# Patient Record
Sex: FEMALE | Race: WHITE | Employment: PART TIME | URBAN - METROPOLITAN AREA
[De-identification: names, ages, dates, MRNs, and addresses within clinical notes are randomized per-mention and may not be internally consistent; named-entity substitution may affect disease eponyms.]

---

## 2021-07-15 ENCOUNTER — HOSPITAL ENCOUNTER (EMERGENCY)
Facility: CLINIC | Age: 41
Discharge: HOME | End: 2021-07-16
Attending: EMERGENCY MEDICINE | Admitting: EMERGENCY MEDICINE
Payer: COMMERCIAL

## 2021-07-15 ENCOUNTER — APPOINTMENT (OUTPATIENT)
Dept: CT IMAGING | Facility: CLINIC | Age: 41
End: 2021-07-15
Attending: EMERGENCY MEDICINE
Payer: COMMERCIAL

## 2021-07-15 DIAGNOSIS — R07.9 CHEST PAIN, UNSPECIFIED TYPE: ICD-10-CM

## 2021-07-15 LAB
ANION GAP SERPL CALCULATED.3IONS-SCNC: 6 MMOL/L (ref 3–14)
BASOPHILS # BLD AUTO: 0 10E3/UL (ref 0–0.2)
BASOPHILS NFR BLD AUTO: 0 %
BUN SERPL-MCNC: 14 MG/DL (ref 7–30)
CALCIUM SERPL-MCNC: 9.1 MG/DL (ref 8.5–10.1)
CHLORIDE BLD-SCNC: 107 MMOL/L (ref 94–109)
CO2 SERPL-SCNC: 25 MMOL/L (ref 20–32)
CREAT SERPL-MCNC: 0.95 MG/DL (ref 0.52–1.04)
D DIMER PPP FEU-MCNC: 1.54 UG/ML FEU (ref 0–0.5)
EOSINOPHIL # BLD AUTO: 0.2 10E3/UL (ref 0–0.7)
EOSINOPHIL NFR BLD AUTO: 2 %
ERYTHROCYTE [DISTWIDTH] IN BLOOD BY AUTOMATED COUNT: 13.3 % (ref 10–15)
GFR SERPL CREATININE-BSD FRML MDRD: 76 ML/MIN/1.73M2
GLUCOSE BLD-MCNC: 82 MG/DL (ref 70–99)
HCG SERPL QL: NEGATIVE
HCT VFR BLD AUTO: 34.7 % (ref 35–47)
HGB BLD-MCNC: 11 G/DL (ref 11.7–15.7)
HOLD SPECIMEN: NORMAL
IMM GRANULOCYTES # BLD: 0 10E3/UL
IMM GRANULOCYTES NFR BLD: 0 %
LYMPHOCYTES # BLD AUTO: 1.9 10E3/UL (ref 0.8–5.3)
LYMPHOCYTES NFR BLD AUTO: 27 %
MCH RBC QN AUTO: 29.5 PG (ref 26.5–33)
MCHC RBC AUTO-ENTMCNC: 31.7 G/DL (ref 31.5–36.5)
MCV RBC AUTO: 93 FL (ref 78–100)
MONOCYTES # BLD AUTO: 0.7 10E3/UL (ref 0–1.3)
MONOCYTES NFR BLD AUTO: 9 %
NEUTROPHILS # BLD AUTO: 4.4 10E3/UL (ref 1.6–8.3)
NEUTROPHILS NFR BLD AUTO: 62 %
NRBC # BLD AUTO: 0 10E3/UL
NRBC BLD AUTO-RTO: 0 /100
PLATELET # BLD AUTO: 203 10E3/UL (ref 150–450)
POTASSIUM BLD-SCNC: 3.8 MMOL/L (ref 3.4–5.3)
RBC # BLD AUTO: 3.73 10E6/UL (ref 3.8–5.2)
SODIUM SERPL-SCNC: 138 MMOL/L (ref 133–144)
TROPONIN I SERPL-MCNC: <0.015 UG/L (ref 0–0.04)
WBC # BLD AUTO: 7.1 10E3/UL (ref 4–11)

## 2021-07-15 PROCEDURE — 84703 CHORIONIC GONADOTROPIN ASSAY: CPT | Performed by: EMERGENCY MEDICINE

## 2021-07-15 PROCEDURE — 36592 COLLECT BLOOD FROM PICC: CPT | Performed by: EMERGENCY MEDICINE

## 2021-07-15 PROCEDURE — 80048 BASIC METABOLIC PNL TOTAL CA: CPT | Performed by: EMERGENCY MEDICINE

## 2021-07-15 PROCEDURE — 85379 FIBRIN DEGRADATION QUANT: CPT | Performed by: EMERGENCY MEDICINE

## 2021-07-15 PROCEDURE — 93005 ELECTROCARDIOGRAM TRACING: CPT

## 2021-07-15 PROCEDURE — 250N000009 HC RX 250: Performed by: EMERGENCY MEDICINE

## 2021-07-15 PROCEDURE — 84484 ASSAY OF TROPONIN QUANT: CPT | Performed by: EMERGENCY MEDICINE

## 2021-07-15 PROCEDURE — 36415 COLL VENOUS BLD VENIPUNCTURE: CPT | Performed by: EMERGENCY MEDICINE

## 2021-07-15 PROCEDURE — 71275 CT ANGIOGRAPHY CHEST: CPT

## 2021-07-15 PROCEDURE — 85025 COMPLETE CBC W/AUTO DIFF WBC: CPT | Performed by: EMERGENCY MEDICINE

## 2021-07-15 PROCEDURE — 99285 EMERGENCY DEPT VISIT HI MDM: CPT | Mod: 25

## 2021-07-15 PROCEDURE — 250N000011 HC RX IP 250 OP 636: Performed by: EMERGENCY MEDICINE

## 2021-07-15 RX ORDER — IOPAMIDOL 755 MG/ML
500 INJECTION, SOLUTION INTRAVASCULAR ONCE
Status: COMPLETED | OUTPATIENT
Start: 2021-07-15 | End: 2021-07-15

## 2021-07-15 RX ADMIN — SODIUM CHLORIDE 86 ML: 9 INJECTION, SOLUTION INTRAVENOUS at 23:12

## 2021-07-15 RX ADMIN — IOPAMIDOL 67 ML: 755 INJECTION, SOLUTION INTRAVENOUS at 23:12

## 2021-07-15 ASSESSMENT — ENCOUNTER SYMPTOMS
SHORTNESS OF BREATH: 0
COUGH: 0
FEVER: 0

## 2021-07-15 ASSESSMENT — MIFFLIN-ST. JEOR: SCORE: 1614.82

## 2021-07-16 VITALS
HEIGHT: 67 IN | HEART RATE: 64 BPM | DIASTOLIC BLOOD PRESSURE: 107 MMHG | BODY MASS INDEX: 31.39 KG/M2 | OXYGEN SATURATION: 99 % | SYSTOLIC BLOOD PRESSURE: 120 MMHG | TEMPERATURE: 98.2 F | RESPIRATION RATE: 20 BRPM | WEIGHT: 200 LBS

## 2021-07-16 LAB
ATRIAL RATE - MUSE: 65 BPM
DIASTOLIC BLOOD PRESSURE - MUSE: NORMAL MMHG
INTERPRETATION ECG - MUSE: NORMAL
P AXIS - MUSE: -6 DEGREES
PR INTERVAL - MUSE: 162 MS
QRS DURATION - MUSE: 88 MS
QT - MUSE: 392 MS
QTC - MUSE: 407 MS
R AXIS - MUSE: 60 DEGREES
SYSTOLIC BLOOD PRESSURE - MUSE: NORMAL MMHG
T AXIS - MUSE: 38 DEGREES
VENTRICULAR RATE- MUSE: 65 BPM

## 2021-07-16 NOTE — ED PROVIDER NOTES
History   Chief Complaint:  Chest Pain       The history is provided by the patient.      Ximena Grant is a 41 year old female with history of papillary thyroid carcinoma with removal three months ago who presents for evaluation of centralized chest pressure. The patient states that she began having this feeling in December, prior to her thyroid carcinoma diagnosis. She was seen at that time and was noted to have an elevated d-dimer and had a CT scan obtained at that time which indicated her thyroid carcinoma. Ultrasound of her leg was negative at that time as well. She underwent surgery for removal in April and has had no complications, noting she is not undergoing chemotherapy. She had hoped that the removal of the thyroid carcinoma would resolve her chest pain issues, however, this has continued to be intermittent since the surgery. Today, this pain has become constant, which concerned her. She also noted a shooting, tingling sensation in her left arm which additionally concerned her. She describes her chest pain as a centralized chest pressure as if someone is squeezing her chest. She notes that she had breathing concerns in December but notes no current issues in the emergency department. She denies any fever or cough. She presents today primarily concerned for a heart attack.     PE and DVT Risk Factors:  Personal hx of PE/DVT:  Negative  Family hx of PE/DVT:   Negative  Recent surgery:   Positive  Hx cancer:    Positive    Review of Systems   Constitutional: Negative for fever.   Respiratory: Negative for cough and shortness of breath.    Cardiovascular: Positive for chest pain.   All other systems reviewed and are negative.        Allergies:  No Known Drug Allergies    Medications:  Ativan  Zofran  Paxil  Midrin    Past Medical History:    Anxiety  Thyroid cancer    Past Surgical History:    Thyroid cancer removal    Social History:  The patient presents to the emergency department with her female  ".    Physical Exam     Patient Vitals for the past 24 hrs:   BP Temp Temp src Pulse Resp SpO2 Height Weight   21 0000 (!) 120/107 -- -- -- -- -- -- --   07/15/21 2335 -- -- -- -- -- -- 1.702 m (5' 7\") 90.7 kg (200 lb)   07/15/21 2330 (!) 159/96 -- -- -- -- -- -- --   07/15/21 2215 (!) 151/99 -- -- 64 -- 99 % -- --   07/15/21 2200 (!) 155/106 -- -- 71 -- 99 % -- --   07/15/21 2145 (!) 151/97 -- -- 62 -- 98 % -- --   07/15/21 2130 (!) 150/101 -- -- 65 -- 98 % -- --   07/15/21 2054 (!) 163/109 98.2  F (36.8  C) Oral 80 20 95 % -- --       Physical Exam  VS: Reviewed per above  HENT: Mucous membranes moist  EYES: sclera anicteric  CV: Rate as noted, regular rhythm.   RESP: Effort normal. Breath sounds are normal bilaterally.  GI: no tenderness/rebound/guarding, not distended.  NEURO: Alert, moving all extremities  MSK: No deformity of the extremities. No calf swelling or asymmetry.  SKIN: Warm and dry        Emergency Department Course   ECG  ECG taken at , ECG read at 2146  Normal sinus rhythm. Normal ECG.  Rate 65 bpm. MS interval 162 ms. QRS duration 88 ms. QT/QTc 392/407 ms. P-R-T axis -6 60 38.      Imaging:  CT Chest (PE) with Contrast:  1.  Moderate air trapping often associated with small vessel or small airways disease. Trace nonspecific effusions.  2.  No pulmonary embolus or aortic aneurysm.  As per radiology.     Laboratory:   CBC: WBC: 7.1, HB.0 (low), PLT: 203    BMP: WNL (Creatinine: 0.95)    Troponin (): <0.015    D-Dimer Quantitative: 1.54 (high)    HCG Qualitative Blood: Negative    Emergency Department Course:    Reviewed:  I reviewed nursing notes, vitals and past medical history    Assessments:  214 I obtained history and examined the patient as noted above.   2222 I reassessed the patient and informed them of their workup thus far.   2352 I rechecked the patient and explained findings. At this point I feel that the patient is safe for discharge, and the patient agrees. "     Disposition:  The patient was discharged to home.     Impression & Plan     Medical Decision Making:  Ximena Grant presented to the ER with chest pain.  Vital signs are reassuring on arrival.  EKG is sinus rhythm without ischemic change.  A single troponin is negative.  In conjunction with atypical history, occult ACS seems unlikely.  No pericardial effusion identified on bedside ultrasound.  History not consistent with pericarditis.  Elevated D-dimer prompted a CT pulmonary angiogram that is negative for PE or other acute cause of her discomfort.  Thereare nonspecific trace effusions and air trapping noted on CT which was relayed to patient.  Patient was reassured.  Plan for close primary care follow.  Return precautions were discussed prior to discharge.    Diagnosis:    ICD-10-CM    1. Chest pain, unspecified type  R07.9      Scribe Disclosure:  I, True Covarrubias, am serving as a scribe at 9:48 PM on 7/15/2021 to document services personally performed by Jaskaran French MD based on my observations and the provider's statements to me.          Jaskaran French MD  07/16/21 0031